# Patient Record
Sex: MALE | Race: ASIAN
[De-identification: names, ages, dates, MRNs, and addresses within clinical notes are randomized per-mention and may not be internally consistent; named-entity substitution may affect disease eponyms.]

---

## 2019-05-21 ENCOUNTER — APPOINTMENT (OUTPATIENT)
Dept: PLASTIC SURGERY | Facility: CLINIC | Age: 44
End: 2019-05-21
Payer: COMMERCIAL

## 2019-05-21 VITALS — WEIGHT: 165 LBS | BODY MASS INDEX: 24.44 KG/M2 | HEIGHT: 69 IN

## 2019-05-21 DIAGNOSIS — Z78.9 OTHER SPECIFIED HEALTH STATUS: ICD-10-CM

## 2019-05-21 DIAGNOSIS — Z80.1 FAMILY HISTORY OF MALIGNANT NEOPLASM OF TRACHEA, BRONCHUS AND LUNG: ICD-10-CM

## 2019-05-21 DIAGNOSIS — E78.00 PURE HYPERCHOLESTEROLEMIA, UNSPECIFIED: ICD-10-CM

## 2019-05-21 PROCEDURE — 99203 OFFICE O/P NEW LOW 30 MIN: CPT

## 2019-05-21 RX ORDER — ROSUVASTATIN CALCIUM 5 MG/1
5 TABLET, FILM COATED ORAL
Qty: 30 | Refills: 0 | Status: ACTIVE | COMMUNITY
Start: 2018-12-17

## 2019-05-21 RX ORDER — ROSUVASTATIN CALCIUM 10 MG/1
10 TABLET, FILM COATED ORAL
Refills: 0 | Status: ACTIVE | COMMUNITY

## 2019-06-20 ENCOUNTER — APPOINTMENT (OUTPATIENT)
Dept: PLASTIC SURGERY | Facility: CLINIC | Age: 44
End: 2019-06-20
Payer: COMMERCIAL

## 2019-06-20 ENCOUNTER — LABORATORY RESULT (OUTPATIENT)
Age: 44
End: 2019-06-20

## 2019-06-20 PROCEDURE — 22903 EXC ABD LES SC 3 CM/>: CPT

## 2019-06-20 PROCEDURE — 21552 EXC NECK LES SC 3 CM/>: CPT

## 2019-06-20 PROCEDURE — 13101 CMPLX RPR TRUNK 2.6-7.5 CM: CPT | Mod: 59

## 2019-06-20 NOTE — PROCEDURE
[FreeTextEntry6] : Preop dx: lipoma, abdomen\par Postopdx: same\par Procedure: excision 3.1cm lipoma abdomen and complex closure of abdomen, 3.1cm\par Anesthesia: local 1% w/ epi\par Specimens: to path\par Procedure:\par 	IC obtained. Lesion demarcated.  Lido 1% w/ epi injected.  15 blade used to incise skin.  Lesion encountered in the subfascial plane and dissected circumferentially. Removed in its entirety, 3.1cm.  Skin edges widely undermined and closed in layers with monocryl for a 3.1cm complex closure. Mastisol and steristrips placed.  No complications.  Specimen sent to path\par \par

## 2019-06-20 NOTE — REASON FOR VISIT
[Procedure: _________] : a [unfilled] procedure visit [FreeTextEntry1] : Excisional bx and closure of mass on Right abdomen.

## 2019-06-25 ENCOUNTER — APPOINTMENT (OUTPATIENT)
Dept: PLASTIC SURGERY | Facility: CLINIC | Age: 44
End: 2019-06-25
Payer: COMMERCIAL

## 2019-06-25 PROCEDURE — 99024 POSTOP FOLLOW-UP VISIT: CPT

## 2019-06-26 NOTE — HISTORY OF PRESENT ILLNESS
[FreeTextEntry1] : s/p excisional biopsy and closure of right abdomen mass DOP 06/20/19.\par Pt is doing better w/time, denies any pain or discomfort.\par Here for follow up. \par \par PATH-Pending.

## 2019-06-26 NOTE — HISTORY OF PRESENT ILLNESS
[FreeTextEntry1] : Patient presents to the office for a possible lipoma on his right abdomen.\par Pt states area has been increasing in size since the past 6 years and denies any Hx of pain or discomfort.\par Pt never seen any specialist and no study was done, no biopsy.\par Pt denies any personal or family history of skin cancer.\par Here to discuss possible excision.

## 2019-06-26 NOTE — CONSULT LETTER
[Dear  ___] : Dear  [unfilled], [Sincerely,] : Sincerely, [Consult Letter:] : I had the pleasure of evaluating your patient, [unfilled]. [FreeTextEntry2] : Dr Pau Clifford [FreeTextEntry1] : Martin has an abdominal mass which appears to be a lipoma. WE will plan to do an excision and biopsy of the mass in the office with local anesthesia. \par I will send additional  correspondence and the pathology report once the procedure is complete.\par \par  Please let me know if you have any questions and if I can ever be of further assistance.  I am a pediatric and adult plastic surgeon who specializes in  skin cancers and pediatric craniofacial anomalies including:facial trauma and paralysis, rhinoplasty,reconstruction and scar revision, as well as many other deformities. Please view our website [email or web address], to see more information on our multispecialty collaborations.  I also participate in most insurance plans, including managed care plans. Thank you again for allowing me to participate in the care of our mutual patient.\par \par  [FreeTextEntry3] : Jimmy Wong MD, FAAP\par Adolfo Melton, FNP-BC\par Pediatric and Adult\par Craniofacial, Reconstructive and Plastic Surgery\par

## 2019-07-25 ENCOUNTER — APPOINTMENT (OUTPATIENT)
Dept: PLASTIC SURGERY | Facility: CLINIC | Age: 44
End: 2019-07-25
Payer: COMMERCIAL

## 2019-07-25 DIAGNOSIS — D17.1 BENIGN LIPOMATOUS NEOPLASM OF SKIN AND SUBCUTANEOUS TISSUE OF TRUNK: ICD-10-CM

## 2019-07-25 PROCEDURE — 99024 POSTOP FOLLOW-UP VISIT: CPT

## 2019-07-25 NOTE — HISTORY OF PRESENT ILLNESS
[FreeTextEntry1] : 43 y/o M presents in office today for follow-up.\par s/p excisional biopsy and closure of right abdomen mass DOP 06/20/19.\par Pt is doing well; no complaints or concerns. \par \par PATH - lipoma

## 2021-05-10 ENCOUNTER — APPOINTMENT (OUTPATIENT)
Dept: DISASTER EMERGENCY | Facility: OTHER | Age: 46
End: 2021-05-10